# Patient Record
Sex: MALE | Race: WHITE
[De-identification: names, ages, dates, MRNs, and addresses within clinical notes are randomized per-mention and may not be internally consistent; named-entity substitution may affect disease eponyms.]

---

## 2018-06-14 ENCOUNTER — HOSPITAL ENCOUNTER (EMERGENCY)
Dept: HOSPITAL 14 - H.ER | Age: 38
Discharge: HOME | End: 2018-06-14
Payer: SELF-PAY

## 2018-06-14 VITALS — TEMPERATURE: 98 F | RESPIRATION RATE: 16 BRPM | OXYGEN SATURATION: 98 %

## 2018-06-14 VITALS — SYSTOLIC BLOOD PRESSURE: 131 MMHG | HEART RATE: 64 BPM | DIASTOLIC BLOOD PRESSURE: 77 MMHG

## 2018-06-14 DIAGNOSIS — S99.921A: Primary | ICD-10-CM
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
## 2018-06-14 DIAGNOSIS — F17.200: ICD-10-CM

## 2018-06-14 NOTE — RAD
PROCEDURE:  Right Foot Radiographs.



HISTORY:

Crush injury  



COMPARISON:

None.



FINDINGS:



BONES:

There is an acute non displaced fracture in the medial base of the 

proximal phalanx of the little toe.



JOINTS:

Normal. 



SOFT TISSUES:

There is mild soft tissue swelling at the 5th MTP joint. 



OTHER FINDINGS:

None.



IMPRESSION:

Acute nondisplaced fracture in the medial base of the proximal 

phalanx of the little finger with mild periarticular soft tissue 

swelling.

## 2018-06-14 NOTE — RAD
PROCEDURE:  Right Ankle Radiographs.



HISTORY:

Crush injury  



COMPARISON:

None



FINDINGS:



BONES:

Bone alignment and mineralization are normal.  There is no acute 

displaced fracture or bone destruction.



JOINTS:

Normal. Ankle mortise maintained. Talar dome intact



SOFT TISSUES:

Normal. 



OTHER FINDINGS:

None.



IMPRESSION:

No acute fracture or dislocation.
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
